# Patient Record
Sex: MALE | URBAN - METROPOLITAN AREA
[De-identification: names, ages, dates, MRNs, and addresses within clinical notes are randomized per-mention and may not be internally consistent; named-entity substitution may affect disease eponyms.]

---

## 2020-06-18 ENCOUNTER — OFFICE VISIT (OUTPATIENT)
Dept: PRIMARY CARE CLINIC | Age: 25
End: 2020-06-18

## 2020-06-18 VITALS — TEMPERATURE: 98.8 F | OXYGEN SATURATION: 98 % | HEART RATE: 81 BPM

## 2020-06-18 PROCEDURE — 99201 PR OFFICE OUTPATIENT NEW 10 MINUTES: CPT | Performed by: NURSE PRACTITIONER

## 2020-06-18 NOTE — PROGRESS NOTES
2020  Edmond Durbin (:  1995)    PCP_______________________    Mandy Showers / Occupation: [employer]   DO YOU NEED A WORK NOTE: [] Yes  [] No   Pharmacy:_________________    FLU/COVID-19 CLINIC EVALUATION    [x] ASYMPTOMATIC  [] RETEST  [x] EXPOSURE TO KNOWN POSITIVE  HPI:Patient presents today for COVID testing. Patient has had positive exposure from co- workers.    SYMPTOMS:  Primary Language: [] English   [] Taiwanese  [] Icelandic  [] Other___________________  Allergies: NKDA  SOCIAL HISTORY:  Number of people living in patient's home (counting the patient as 1):  [] 1   [] 2   [x] 3   [] 4   [] 5   [] >6    Symptom duration, days:  [] 1   [] 2   [] 3   [] 4 - 7   [] 8 - 10   [] 11 - 13   [] >14    [] Fevers    [] Symptom (not measured)  [] Measured (Result:  degrees)  [] Chills  [] Cough [] Dry [] Productive   []Loss of Taste  [] Loss of Smell  []Decreased Appetite  [] Coughing up blood  }  [] Chest Congestion  [] Nasal Congestion  [] Runny  Nose  [] Sneezing  [] Feeling short of breath   []Sometimes    [] Frequently    [] All the time     [] Chest pain     [] Headaches  []Tolerable  [] Severe     [] Fatigue  [] Sore throat  [] Muscle aches  [] Nausea  [] Vomiting  []Unable to keep fluids down     [] Diarrhea  []Severe       [] OTHER SYMPTOMS:    Symptom course:   [] Worsening     [] Stable     [] Improving    RISK FACTORS:1}  [] Pregnant or possibly pregnant  [] Age over 61  [] Diabetes  [] HTN  [] Heart disease  [] Asthma  [] COPD/Other chronic lung diseases  [] Active Cancer  [] On Chemotherapy  [] Taking oral steroids  [] History Lymphoma/Leukemia  [] Autoimmune Disorder  [x] Close contact with a lab confirmed COVID-19 patient within 14 days of symptom onset  [] History of travel from affected geographical areas within 14 days of symptom onset     PHYSICAL EXAMINATION:  Vitals:    20 1312   Pulse: 81   Temp: 98.8 °F (37.1 °C)   SpO2: 98%        INSTRUCTIONS:  \"[x]\" Indicates a positive item \"[]\" Indicates a negative item    DELETE ALL ITEMS NOT EXAMINED    [x] Alert  [x] Oriented to person/place/time    [x] No apparent distress  [] Toxic appearing    [] Face flushed appearing [x] Sclera clear  [] Lips are cyanotic      [x] Breathing appears normal  [] Appears tachypneic  [x] Speaks in complete sentences    [] Rash on visible skin    [] Cranial Nerves II-XII grossly intact    [x] Motor grossly intact in visible upper extremities    [] Motor grossly intact in visible lower extremities    [x] Normal Mood  [] Anxious appearing    [] Depressed appearing  [] Confused appearing      [] Poor short term memory  [] Poor long term memory    [] OTHER:  1}    TESTS ORDERED:    [] POCT FLU  [] POCT STREP  [x] COVID-19 Test sent    TEST RESULTS:    POCT FLU test:  [] Positive  [] Negative  POCT STREP test:  [] Positive  [] Negative    ASSESSMENT:    [] Flu  [] Strep Throat  [] Uncertain Viral Respiratory Infection  [x] Possible COVID-19    PLAN:    [x] Discharge home with written instructions for:  [] Flu management  [] Strep throat management  [] Viral respiratory illness management  [x] Possible COVID-19 infection with self-quarantine and management of symptoms  [x] Follow-up with primary care physician or emergency department if worsens  [] Referred to emergency department for evaluation    Written by Kimberley Vargas LPN acting as a scribe for Chika KANG  on 6/18/2020 at 1:14 PM.        An  electronic signature was used to authenticate this note. --Kimberley Vargas LPN on 2/49/9131 at 4:60 PM     I, Chika Jenkins, personally performed the services described in the documentation as scribed by Elizabeth Ying LPN in my presence and it is both accurate and complete.   Electronically signed by PEARL Fox - CNP on 6/18/2020 at 1:34 PM.

## 2020-06-18 NOTE — PATIENT INSTRUCTIONS
Steps to help prevent the spread of COVID-19 if you are sick  SOURCE - https://arguelles-luong.info/. html     Stay home except to get medical care   ; Stay home: People who are mildly ill with COVID-19 are able to isolate at home during their illness. You should restrict activities outside your home, except for getting medical care.   ; Avoid public areas: Do not go to work, school, or public areas.   ; Avoid public transportation: Avoid using public transportation, ride-sharing, or taxis.  ; Separate yourself from other people and animals in your home   ; Stay away from others: As much as possible, you should stay in a specific room and away from other people in your home. Also, you should use a separate bathroom, if available.   ; Limit contact with pets & animals: You should restrict contact with pets and other animals while you are sick with COVID-19, just like you would around other people. Although there have not been reports of pets or other animals becoming sick with COVID-19, it is still recommended that people sick with COVID-19 limit contact with animals until more information is known about the virus. ; When possible, have another member of your household care for your animals while you are sick. If you are sick with COVID-19, avoid contact with your pet, including petting, snuggling, being kissed or licked, and sharing food. If you must care for your pet or be around animals while you are sick, wash your hands before and after you interact with pets and wear a facemask. See COVID-19 and Animals for more information. Other considerations   The ill person should eat/be fed in their room if possible. Non-disposable  items used should be handled with gloves and washed with hot water or in a . Clean hands after handling used  items.  If possible, dedicate a lined trash can for the ill person.  Use gloves when removing garbage bags, handling, and disposing of trash. Wash hands after handling or disposing of trash.  Consider consulting with your local health department about trash disposal guidance if available. Information for Household Members and Caregivers of Someone who is Sick   Call ahead before visiting your doctor   Call ahead: If you have a medical appointment, call the healthcare provider and tell them that you have or may have COVID-19. This will help the healthcare provider's office take steps to keep other people from getting infected or exposed. Wear a facemask if you are sick   ; If you are sick: You should wear a facemask when you are around other people (e.g., sharing a room or vehicle) or pets and before you enter a healthcare provider's office. ; If you are caring for others: If the person who is sick is not able to wear a facemask (for example, because it causes trouble breathing), then people who live with the person who is sick should not stay in the same room with them, or they should wear a facemask if they enter a room with the person who is sick. Cover your coughs and sneezes   ; Cover: Cover your mouth and nose with a tissue when you cough or sneeze.   ; Dispose: Throw used tissues in a lined trash can.   ; Wash hands: Immediately wash your hands with soap and water for at least 20 seconds or, if soap and water are not available, clean your hands with an alcohol-based hand  that contains at least 60% alcohol. Clean your hands often   ;  Wash hands: Wash your hands often with soap and water for at least 20 seconds, especially after blowing your nose, coughing, or sneezing; going to the bathroom; and before eating or preparing food.   ; Hand : If soap and water are not readily available, use an alcohol-based hand  with at least 60% alcohol, covering all surfaces of your hands and rubbing them together until they feel dry.   ; Soap and water: Soap and water are the best option if

## 2020-06-21 LAB
SARS-COV-2: NOT DETECTED
SOURCE: NORMAL